# Patient Record
(demographics unavailable — no encounter records)

---

## 2024-10-11 NOTE — HISTORY OF PRESENT ILLNESS
[Home] : at home, [unfilled] , at the time of the visit. [Other Location: e.g. Home (Enter Location, City,State)___] : at [unfilled] [Verbal consent obtained from patient] : the patient, [unfilled] [Family Member] : family member [FreeTextEntry3] : Jackelyn Shaikh, daughter [FreeTextEntry1] : F/u post hospital discharge [facility]  [de-identified] : Ms. Luis E Yeung is a 91F w/ pmh of HFpEF (last EF 9/17, 69%), rate controlled afib w/ PM in place (last interrogated 9/18), severe AS, hypothyroidism, HTN, HLD, CKD, s/p 2 brain surgery for brain tumor (benign)>15years ago who was transferred from [facility] for altered mental status and heart failure exacerbation. Patient had a recent admission for ADHF, discharged on 9/25 to home with PRN lasix. She was admitted from 9/29-10/8 at [facility], treated with IV diuresis, repeat echo w/o wall motion abnormalities. Hospital course c/b afib RVR and ROSANNA on 10/1 likely due to metoprolol under medication and intravascular depletion. Also had some hypotension likely 2/2 to intravascular depletion. Afib RVR resolved with IV lopressor. Suspect prerenal ROSANNA due to diuresis. Nifedipine held inpatient for recurrent episodes of low blood pressure. Per discussion with cardiology and family members, family not interested in hospice at this time and would like to continue to pursue medical treatment for advanced heart failure. Patient was discharged with  new home O2 and home care services. Seen today for follow up.  Ms. Yeung is seen via telecommunication video visit facilitated by daughter Jackelyn and collateral information also provided by son Pedro Luis Mathis over the phone. Pt is observed seated in bed, fed by HHA, on home o2 and does not appear in any distress. SpO2 98% on 1LNC HR 60-66. Since discharge home, reports pt still lethargic and weak, but no acute distress. She was seen by home care RN and is pending OT/PT services. Patient requires full assistance with ADLs, ambulation and mainly bedbound. Per daughter, pt tolerating minced and moist diet. Denies any fever/chills, SOB, CP, or change in mentation. Denies any swelling in extremities. Aware to continue with Lasix with low sodium and fluid restrictions. Goals of care, advanced care directives and house calls providers discussed, Umesh Mathis is HCP/CDPAP through [facility], 48 hours/week. Receiving [facility] services from [facility]. PCP Dr. Helena Lind office is 2 houses over.  DME: Hospital bed, wheelchair, rollator and Supplemental Oxygen

## 2024-10-11 NOTE — INTERPRETER SERVICES
[Ad Hoc ] : provided by an ad hoc  [Interpreters_FullName] : Jackelyn [Interpreters_Relationshiptopatient] : daughter [TWNoteComboBox1] : Елена

## 2024-10-11 NOTE — COUNSELING
[Fall prevention counseling provided] : Fall prevention counseling provided [Use recommended devices] : Use recommended devices [Needs reinforcement, provided] : Patient needs reinforcement on understanding of lifestyle changes and steps needed to achieve self management goal; reinforcement was provided

## 2024-10-11 NOTE — PHYSICAL EXAM
[No Acute Distress] : no acute distress [No Respiratory Distress] : no respiratory distress  [No Accessory Muscle Use] : no accessory muscle use [de-identified] : Physical exam limited d/t telehealth encounter, pt appears comfortable and in nad [de-identified] : observed with NC

## 2024-10-11 NOTE — HEALTH RISK ASSESSMENT
[With Patient/Caregiver] : , with patient/caregiver [Designated Healthcare Proxy] : Designated healthcare proxy [Name: ___] : Health Care Proxy's Name: [unfilled]  [I will adhere to the patient's wishes.] : I will adhere to the patient's wishes. [Time Spent: ___ minutes] : Time Spent: [unfilled] minutes [AdvancecareDate] : 10/11/2024 [FreeTextEntry4] : Goals of care, advanced care directives and house calls providers discussed, Son Pedro Luis Mathis is HCP/CDPAP through [facility], 48 hours/week. MOLST, Hospice/palliative care also discussed, per daughter she would not want any heroic measures such as chest compressions or intubation. States grandson who is a MD (anesthesiologist) has discussed prognosis with family, total of 6 children involved in care and will need to discuss further. Now amenable to House calls, referral made 10/11.

## 2024-10-11 NOTE — PLAN
[FreeTextEntry1] : Ms. Luis E Yeung is a 91F w/ pmh of HFpEF (last EF 9/17, 69%), rate controlled afib w/ PM in place (last interrogated 9/18), severe AS, hypothyroidism, HTN, HLD, CKD, s/p 2 brain surgery for brain tumor (benign)>15years ago who was transferred from [facility] for altered mental status and heart failure exacerbation. Patient had a recent admission for ADHF, discharged on 9/25 to home with PRN lasix. She was admitted from 9/29-10/8 at [facility], treated with IV diuresis, repeat echo w/o wall motion abnormalities. Hospital course c/b afib RVR and ROSANNA on 10/1 likely due to metoprolol under medication and intravascular depletion. Also had some hypotension likely 2/2 to intravascular depletion. Afib RVR resolved with IV lopressor. Suspect prerenal ROSANNA due to diuresis. Nifedipine held inpatient for recurrent episodes of low blood pressure. Per discussion with cardiology and family members, family not interested in hospice at this time and would like to continue to pursue medical treatment for advanced heart failure. Patient was discharged with new home O2 and home care services. Seen today for follow up.  -Referral made to [facility] calls on 10/11/2024 -Spoke with [facility], confirmed RN scheduled for tomorrow, pending OT/PT -Will need ongoing GOC  Educated family extensively on fall and safety precautions, aspiration precautions, good skin care and importance to notify MD / return to ED for any change in mental status or worsening s/s as reviewed. Reinforced provider follow up and medication compliance. 24/7 TCM contact provided and encouraged to call with any questions or concerns.

## 2024-10-11 NOTE — REVIEW OF SYSTEMS
[Muscle Weakness] : muscle weakness [Negative] : Integumentary [Fever] : no fever [Chills] : no chills [FreeTextEntry2] : history provided by daughter [FreeTextEntry6] : on supplemental O2 [FreeTextEntry9] : bedbound, x2 assist with transfers

## 2024-10-25 NOTE — REASON FOR VISIT
[Initial Evaluation] : an initial evaluation [Family Member] : family member [Pre-Visit Preparation] : pre-visit preparation was done [Intercurrent Specialty/Sub-specialty Visits] : the patient has intercurrent specialty/sub-specialty visits [Home] : at home, [unfilled] , at the time of the visit. [Other Location: e.g. Home (Enter Location, City,State)___] : at [unfilled] [FreeTextEntry2] : daughter Nasma

## 2024-10-25 NOTE — REVIEW OF SYSTEMS
[Fatigue] : fatigue [Muscle Weakness] : muscle weakness [Muscle Pain] : muscle pain [Back Pain] : back pain [Negative] : Heme/Lymph

## 2024-10-25 NOTE — HISTORY OF PRESENT ILLNESS
[House Calls Co-Management Patient] : [unfilled] is a House Calls co-management patient [PT] : PT [Patient is stable] : patient is stable [Family Member] : family member [In-Place] : has aide services in-place [A] : A [LastPCPVisitDate] : 1 week ago Oct 2024 [FreeTextEntry4] : Dr Adams- cardio [FreeTextEntry1] : limited mobility [FreeTextEntry2] : history obtained from daughter Mimi  [facility]: anemia, afib s/p ppm, sever as, chf, htn, hld, hypothyroid, ckd, brain tumor s/p sx 1998.    A&Ox2 person and place. sometimes forgets children's names.      In bed for visit.     Appetite/ Diet- normal, no dysphagia.     BM- normal daily.     Urine output - normal     Incontinence - denies     Sleep - takes mirtazapine and melatonin.      Pain - denies.     Skin - no breakdown     Ambulation: bed bound. stopped walking in Sept 2024 after being in the hospital. was walking with a walker. brother gets her in a chair once a week.      Falls- denies   Behavior/ Mood - pleasant during exam. denies agitation

## 2024-10-25 NOTE — PHYSICAL EXAM
[No Acute Distress] : no acute distress [No Respiratory Distress] : no respiratory distress [No Rash] : no rash [de-identified] : tongue is dark/white [de-identified] : no edema noted [de-identified] : bed bound

## 2025-01-14 NOTE — REASON FOR VISIT
[Follow-Up] : a follow-up visit [Family Member] : family member [Pre-Visit Preparation] : pre-visit preparation was done [Intercurrent Specialty/Sub-specialty Visits] : the patient has intercurrent specialty/sub-specialty visits [FreeTextEntry2] : Pt admitted [facility] 12/24-1/2 for  hypoglycemia, CHF discharged on torsemide 40 mg oral tablet: 1 tab(s) orally 2 times a day metolozone 2.5 BID

## 2025-01-14 NOTE — HEALTH RISK ASSESSMENT
[Full assistance needed] : managing medications [] : managing finances [No falls in past year] : Patient reported no falls in the past year [HRA Reviewed] : Health Risk Assessment [facility]

## 2025-01-14 NOTE — HEALTH RISK ASSESSMENT
[Full assistance needed] : managing medications [] : managing finances [No falls in past year] : Patient reported no falls in the past year [HRA Reviewed] : [facility]

## 2025-01-14 NOTE — REASON FOR VISIT
[Follow-Up] : a follow-up visit [Family Member] : family member [Pre-Visit Preparation] : pre-visit preparation was done [Intercurrent Specialty/Sub-specialty Visits] : the patient has intercurrent specialty/sub-specialty visits [FreeTextEntry2] : Pt admitted [facility] 12/24-1/2 for  hypoglycemia, CHF discharged on torsemide 40 mg oral tablet: 1 tab(s) orally 2 times a day metolozone 2.5 BID

## 2025-01-14 NOTE — PHYSICAL EXAM
[No Acute Distress] : no acute distress [No Respiratory Distress] : no respiratory distress [No Rash] : no rash [de-identified] : tongue is dark/white [de-identified] : no edema noted [de-identified] : bed bound

## 2025-01-14 NOTE — HISTORY OF PRESENT ILLNESS
[House Calls Co-Management Patient] : [unfilled] is a House Calls co-management patient [PT] : PT [In-Place] : has aide services in-place [A] : A [Patient is stable] : patient is stable [Family Member] : family member [LastPCPVisitDate] : 1 week ago Oct 2024 [FreeTextEntry4] : Dr Adams- cardio [FreeTextEntry1] : limited mobility [FreeTextEntry2] : 90 yo hx anemia, afib s/p ppm, severe as, chf, htn, hld, hypothyroid, ckd, brain tumor s/p sx 1998.    s/p 2 admissions for CHF. On torsemide 40 BID and metalozone 2.5 BID afib- on metorpolol 50 BID and renal dose eliquis. HTN. metoprolol 50 BID labs 1/7 cr 2.45 GFR 18. hgb 9.5 up from 8  Appetite/ Diet. Needs to be spoonfed purees BM- none in 4 days. on senna Urine output - normal Sleep - takes mirtazapine 30 and melatonin. Very sleepy now Pain - seems to be in pain often, but cannot verbally express Skin - no breakdown Ambulation: bed bound.since  Sept 2024 Falls- denies  CDPAS 48 hours

## 2025-01-14 NOTE — HEALTH RISK ASSESSMENT
[Full assistance needed] : managing medications [] : managing finances [No falls in past year] : Patient reported no falls in the past year [HRA Reviewed] : Health Risk Assessment reviewed [facility]

## 2025-01-14 NOTE — REASON FOR VISIT
[Follow-Up] : a follow-up visit [Family Member] : family member [Pre-Visit Preparation] : pre-visit preparation was done [Intercurrent Specialty/Sub-specialty Visits] : the patient has intercurrent specialty/sub-specialty visits [FreeTextEntry2] : Pt admitted [facility] 12/24-1/2 for  hypoglycemia, CHF discharged on torsemide 40 mg oral tablet: 1 tab(s) orally 2 times a day metolozone 2.5 BID

## 2025-01-14 NOTE — HISTORY OF PRESENT ILLNESS
[House Calls Co-Management Patient] : [unfilled] is a House Calls co-management patient [PT] : PT [In-Place] : has aide services in-place [A] : A [Patient is stable] : patient is stable [Family Member] : family member [LastPCPVisitDate] : 1 week ago Oct 2024 [FreeTextEntry4] : Dr Adams- cardio [FreeTextEntry1] : limited mobility [FreeTextEntry2] : 92 yo hx anemia, afib s/p ppm, severe as, chf, htn, hld, hypothyroid, ckd, brain tumor s/p sx 1998.    s/p 2 admissions for CHF. On torsemide 40 BID and metalozone 2.5 BID afib- on metorpolol 50 BID and renal dose eliquis. HTN. metoprolol 50 BID labs 1/7 cr 2.45 GFR 18. hgb 9.5 up from 8  Appetite/ Diet. Needs to be spoonfed purees BM- none in 4 days. on senna Urine output - normal Sleep - takes mirtazapine 30 and melatonin. Very sleepy now Pain - seems to be in pain often, but cannot verbally express Skin - no breakdown Ambulation: bed bound.since  Sept 2024 Falls- denies  CDPAS 48 hours

## 2025-01-14 NOTE — PHYSICAL EXAM
[No Acute Distress] : no acute distress [No Respiratory Distress] : no respiratory distress [No Rash] : no rash [de-identified] : tongue is dark/white [de-identified] : no edema noted [de-identified] : bed bound

## 2025-01-14 NOTE — PHYSICAL EXAM
[No Acute Distress] : no acute distress [No Respiratory Distress] : no respiratory distress [No Rash] : no rash [de-identified] : tongue is dark/white [de-identified] : no edema noted [de-identified] : bed bound